# Patient Record
Sex: MALE | Race: BLACK OR AFRICAN AMERICAN
[De-identification: names, ages, dates, MRNs, and addresses within clinical notes are randomized per-mention and may not be internally consistent; named-entity substitution may affect disease eponyms.]

---

## 2021-12-01 ENCOUNTER — HOSPITAL ENCOUNTER (OUTPATIENT)
Dept: HOSPITAL 61 - ENDOS | Age: 56
Discharge: HOME | End: 2021-12-01
Attending: INTERNAL MEDICINE
Payer: COMMERCIAL

## 2021-12-01 VITALS — HEIGHT: 73 IN | WEIGHT: 276.46 LBS | BODY MASS INDEX: 36.64 KG/M2

## 2021-12-01 VITALS — SYSTOLIC BLOOD PRESSURE: 153 MMHG | DIASTOLIC BLOOD PRESSURE: 96 MMHG

## 2021-12-01 VITALS — DIASTOLIC BLOOD PRESSURE: 97 MMHG | SYSTOLIC BLOOD PRESSURE: 136 MMHG

## 2021-12-01 DIAGNOSIS — E78.00: ICD-10-CM

## 2021-12-01 DIAGNOSIS — Z12.11: Primary | ICD-10-CM

## 2021-12-01 DIAGNOSIS — Z80.0: ICD-10-CM

## 2021-12-01 DIAGNOSIS — K64.0: ICD-10-CM

## 2021-12-01 DIAGNOSIS — M19.90: ICD-10-CM

## 2021-12-01 DIAGNOSIS — Z79.899: ICD-10-CM

## 2021-12-01 DIAGNOSIS — Z98.890: ICD-10-CM

## 2021-12-01 DIAGNOSIS — K63.5: ICD-10-CM

## 2021-12-01 DIAGNOSIS — I10: ICD-10-CM

## 2021-12-01 DIAGNOSIS — K63.89: ICD-10-CM

## 2021-12-01 PROCEDURE — 45380 COLONOSCOPY AND BIOPSY: CPT

## 2021-12-01 NOTE — CONS
DATE OF CONSULTATION: 12/01/2021

GASTROINTESTINAL CONSULTATION



REFERRING PHYSICIAN:  Jack Ingram



REASON FOR CONSULTATION:  Colorectal screening and positive family history of 

colon cancer.



HISTORY OF PRESENT ILLNESS:  A 56-year-old male whose past medical history is 

significant for hypertension, seen for screening colon.  Bowel habits are 

regular without diarrhea or constipation.  There has been no melena and/or 

hematochezia.  Weight and appetite are stable.  Family history is positive for 

colon cancer with three uncles who have passed in their 40s and 50s from this.  

This is his first colon exam.  He is otherwise without additional complaints.



PAST MEDICAL HISTORY:  Hypertension, osteoarthrosis.



ALLERGIES:  None.



MEDICATIONS:  Include amlodipine 5 mg daily, lisinopril/hydrochlorothiazide 1 

daily, vitamin B6 daily.



FAMILY AND SOCIAL HISTORY:  He is incarcerated.  Does not drink or smoke.



PAST SURGICAL HISTORY:  Noncontributory.



REVIEW OF SYSTEMS:

HEENT:  There is no decreased hearing or visual acuity issues.

CARDIAC:  There is a history of hypertension.

PULMONARY:  No shortness of breath, productive cough, asthma.

RENAL:  No dysuria, frequency, hematuria.

MUSCULOSKELETAL:  History of osteoarthrosis.

DERMATOLOGIC:  No skin rashes or pruritus.

HEMATOLOGIC:  No bleeding, bruising coagulopathy.

GASTROINTESTINAL:  See history of present illness.



PHYSICAL EXAMINATION:

GENERAL:  Reveals a well-developed, well-nourished male.

VITAL SIGNS:  Temp is 97.2, pulse 97, respiratory rate is 14.

LUNGS:  Clear.

CARDIOVASCULAR:  Reveals an S1, S2, without S3, S4 or appreciable murmur.

ABDOMEN:  Reveals a soft abdomen, normal bowel sounds, without appreciable 

hepatosplenomegaly.

EXTREMITIES:  Reveals no cyanosis, clubbing or edema.



IMPRESSION:  Colorectal screening with positive family history of colon cancer 

is recommended at this time.  Risks and benefits of procedure including the risk

of hemorrhage and perforation have been discussed with the patient and he is 

willing to proceed at this time.



I would like to thank  for asking us to consult and participate in 

this patient's care.







SSP/LAWSON/MAN

DR: SSP/nts   DD: 12/01/2021 07:10

DT: 12/01/2021 07:24   TID: [id number]

## 2021-12-06 NOTE — PATHOLOGY
[facility] Accession Number: [id number]

.                                                                01

Material submitted:                                        .

sigmoid colon - SIGMOID POLYP BIOPSY

.                                                                01

Clinical history:                                          .

CRC SCREENING

COLONOSCOPY

.                                                                02

**********************************************************************

Diagnosis:

Colon biopsies, sigmoid polyps:

- Hyperplastic polyps.

(JPM:[facility]; 12/06/2021)

S  12/06/2021  0847 Local

**********************************************************************

.                                                                02

Comment:

There are no adenomatous changes or evidence of malignancy.

(JPM:[facility]; 12/06/2021)

.                                                                02

Electronically signed:                                     .

Maximino Rahman MD, Pathologist

NPI- [id number]

.                                                                01

Gross description:                                         .

The specimen is received in formalin, labeled "Dennys Torres, sigmoid

polyp biopsy".  Received is a segment of pale tan tissue measuring 1.0 cm

in maximum dimensions.  The specimen is submitted entirely in cassette A1.

([facility]; 12/3/2021)

QA/[facility]  12/03/2021  1240 Local

.                                                                02

Pathologist provided ICD-10:

K63.5

.                                                                02

CPT                                                        .

[id number]

Specimen Comment: A courtesy copy of this report has been sent to [phone]

Specimen Comment: Report sent to 

***Performed at:  01

   [facility]

   [address], [place]  [id number]

   MD Josesito Allan MD Phone:  [phone]

***Performed at:  02

   [facility]

   [address] [place]  [id number]

   MD Maximino Rahman MD Phone:  [phone]